# Patient Record
Sex: FEMALE
[De-identification: names, ages, dates, MRNs, and addresses within clinical notes are randomized per-mention and may not be internally consistent; named-entity substitution may affect disease eponyms.]

---

## 2018-10-29 ENCOUNTER — RX ONLY (OUTPATIENT)
Age: 53
Setting detail: RX ONLY
End: 2018-10-29

## 2018-10-29 RX ORDER — FLUOCINONIDE 1 MG/G
CREAM TOPICAL
Qty: 1 | Refills: 3 | Status: ERX | COMMUNITY
Start: 2018-10-29

## 2019-04-02 ENCOUNTER — HOSPITAL ENCOUNTER (OUTPATIENT)
Dept: HOSPITAL 88 - OR | Age: 54
Discharge: HOME | End: 2019-04-02
Attending: SPECIALIST
Payer: COMMERCIAL

## 2019-04-02 VITALS — DIASTOLIC BLOOD PRESSURE: 74 MMHG | SYSTOLIC BLOOD PRESSURE: 117 MMHG

## 2019-04-02 DIAGNOSIS — Z01.810: ICD-10-CM
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
## 2019-04-02 DIAGNOSIS — R00.1: ICD-10-CM

## 2019-04-02 DIAGNOSIS — Z88.8: ICD-10-CM

## 2019-04-02 DIAGNOSIS — Z86.19: ICD-10-CM

## 2019-04-02 DIAGNOSIS — M19.90: ICD-10-CM

## 2019-04-02 DIAGNOSIS — M94.212: ICD-10-CM

## 2019-04-02 DIAGNOSIS — M75.112: ICD-10-CM

## 2019-04-02 DIAGNOSIS — Z91.041: ICD-10-CM

## 2019-04-02 DIAGNOSIS — S43.432A: Primary | ICD-10-CM

## 2019-04-02 PROCEDURE — 29827 SHO ARTHRS SRG RT8TR CUF RPR: CPT

## 2019-04-02 PROCEDURE — 93005 ELECTROCARDIOGRAM TRACING: CPT

## 2019-04-02 PROCEDURE — 29826 SHO ARTHRS SRG DECOMPRESSION: CPT

## 2019-04-02 PROCEDURE — 29828 SHO ARTHRS SRG BICP TENODSIS: CPT

## 2019-04-04 NOTE — OPERATIVE REPORT
DATE OF PROCEDURE:  04/02/2019

 

SURGEON:  Buddy Wright MD

 

PREOPERATIVE DIAGNOSIS:  Left shoulder labral tear.

 

POSTOPERATIVE DIAGNOSES:  Left shoulder labral tear, left shoulder rotator cuff tear.

 

OPERATIONS/PROCEDURE PERFORMED:  The patient underwent a left shoulder examination under

anesthesia, left shoulder arthroscopy, left shoulder debridement of a partial rotator

cuff tear as well as debridement of a labral tear.  The patient underwent a left

shoulder arthroscopic biceps tendinosis, left shoulder arthroscopic rotator cuff

reconstruction, and a left shoulder arthroscopic subacromial decompression and

acromioplasty. 

 

ASSISTANT:  Elvie Anderson.

 

ANESTHESIA:  General endotracheal intubation anesthesia as well as an interscalene block.

 

IV FLUIDS:  As per the anesthesia record.

 

COMPLICATIONS:  None.

 

BRIEF DISCUSSION OF THE PATIENT'S OPERATIVE PROCEDURE:  Ms. Salgado was taken to the

operating room, placed in the supine position on the operating table.  Following

induction of general anesthesia and an interscalene block, the patient's bed was

converted to beach chair type position.  Examination of the left shoulder demonstrated

no gross abnormalities.  She had poor passive range of motion of the shoulder joint.

There was no evidence of instability.  The patient's shoulder and upper extremity were

prepped and draped in standard surgical fashion.  A standard posterolateral and anterior

port was created without difficulty.  The scope was placed within the shoulder

atraumatically.  Examination of the glenohumeral articulation demonstrated, there is

significant evidence of chondromalacia.  There were no loose bodies in the shoulder

joint.  There was a type 2 labral injury with significant displacement of the biceps

anchor from the superior rim of the glenoid.  A probe was placed in the shoulder joint

and examination of the labral tissue demonstrated significant damage to the tissue

itself.  This was debrided using a shaver.  Examination of the rotator cuff tissue

demonstrated a partial tearing of the rotator cuff.  The shaver was also used to debride

this partial rotator cuff injury.  The anterior leading edge of the rotator cuff was

found to have a near full segment tear.  Attention was first turned to the patient's

labral injury.  Given the degree of damage to the labrum, the decision was to provide

the patient a biceps tenodesis.  The rotator interval was debrided.  The labral tissue

was also further debrided.  Sutures were shuttled through the rotator interval capturing

the biceps tendon.  Biceps tendon was then freed from the superior rim of the labrum.

The labrum and cut biceps tendon were then debrided to a stable construct.  The

remainder of the biceps tenodesis was deferred to later in the case.  Attention was

further turned to the rotator cuff injury.  A shaver was used to debride the insertion

site for the rotator cuff.  The remaining fibers of the rotator tear were elevated from

near insertion to the greater tuberosity.  The shaver was used to further debride the

insertion site to a bleeding bony bed.  The shoulder was deflated with sterile normal

saline.  The scope was transferred to subacromial space.  Significant bursal

inflammation was encountered.  A lateral portal was created with an outside-in

technique.  The shaver was used to debride the bursa.  The rotator cuff injury was

easily identified.  An anterolateral accessory portal was created with an inside-out

technique.  The insertion site of the rotator cuff was debrided further.  A single

double-loaded suture anchor was then inserted into the greater tuberosity of the

humerus.  The sutures was passed to the rotator cuff tissue and the rotator cuff tissue

was then advanced into its normal insertion and tied firmly.  This resulted in complete

reapproximation of the patient's rotator cuff injury.  The suture for the biceps

tenodesis was also identified in the rotator interval.  These sutures were captured and

firmly tied over the rotator interval completing the biceps tenodesis.  The

coracoacromial ligament was then resected and aggressive acromioplasty was then

performed.  The shoulder was deflated with sterile normal saline.  The portal sites were

closed.  Sterile dressings were applied and the patient was provided a shoulder

immobilizer, awakened and taken to the postanesthesia care unit in stable condition. 

 

 

 

 

______________________________

MD TESSY Butler/COLIN

D:  04/03/2019 18:23:18

T:  04/04/2019 01:18:11

Job #:  [id number]

## 2019-07-24 ENCOUNTER — HOSPITAL ENCOUNTER (OUTPATIENT)
Dept: HOSPITAL 88 - PT | Age: 54
LOS: 7 days | End: 2019-07-31
Attending: SPECIALIST
Payer: COMMERCIAL

## 2019-07-24 DIAGNOSIS — M75.102: ICD-10-CM

## 2019-07-24 DIAGNOSIS — M25.612: ICD-10-CM

## 2019-07-24 DIAGNOSIS — M25.512: Primary | ICD-10-CM

## 2019-07-24 DIAGNOSIS — M62.81: ICD-10-CM

## 2019-07-24 PROCEDURE — 97139 UNLISTED THERAPEUTIC PX: CPT

## 2019-08-30 ENCOUNTER — HOSPITAL ENCOUNTER (OUTPATIENT)
Dept: HOSPITAL 88 - PT | Age: 54
LOS: 1 days | End: 2019-08-31
Attending: SPECIALIST
Payer: COMMERCIAL

## 2019-08-30 DIAGNOSIS — M62.81: ICD-10-CM

## 2019-08-30 DIAGNOSIS — M25.612: ICD-10-CM

## 2019-08-30 DIAGNOSIS — M25.512: Primary | ICD-10-CM

## 2019-08-30 PROCEDURE — 97139 UNLISTED THERAPEUTIC PX: CPT

## 2019-09-16 ENCOUNTER — HOSPITAL ENCOUNTER (OUTPATIENT)
Dept: HOSPITAL 88 - PT | Age: 54
LOS: 14 days | End: 2019-09-30
Attending: SPECIALIST
Payer: COMMERCIAL

## 2019-09-16 DIAGNOSIS — M75.102: Primary | ICD-10-CM

## 2019-09-16 PROCEDURE — 97139 UNLISTED THERAPEUTIC PX: CPT
